# Patient Record
Sex: FEMALE | Race: BLACK OR AFRICAN AMERICAN | NOT HISPANIC OR LATINO
[De-identification: names, ages, dates, MRNs, and addresses within clinical notes are randomized per-mention and may not be internally consistent; named-entity substitution may affect disease eponyms.]

---
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
## 2024-06-07 ENCOUNTER — EMERGENCY (EMERGENCY)
Facility: HOSPITAL | Age: 19
LOS: 1 days | Discharge: HOME | End: 2024-06-07
Attending: EMERGENCY MEDICINE
Payer: COMMERCIAL

## 2024-06-07 VITALS
WEIGHT: 224.87 LBS | SYSTOLIC BLOOD PRESSURE: 119 MMHG | OXYGEN SATURATION: 98 % | TEMPERATURE: 98 F | RESPIRATION RATE: 20 BRPM | DIASTOLIC BLOOD PRESSURE: 73 MMHG | HEART RATE: 83 BPM

## 2024-06-07 PROCEDURE — 99283 EMERGENCY DEPT VISIT LOW MDM: CPT

## 2024-06-07 PROCEDURE — 99282 EMERGENCY DEPT VISIT SF MDM: CPT

## 2024-06-07 NOTE — ED PROVIDER NOTE - OBJECTIVE STATEMENT
19 y/o F with no reported PMHx/PSHx p/w c/o intermittent pain over right palm x 1 week. Pain only triggered with certain movements. Denies any trauma/injury. Thinks she might have strained her hand with heavy lifting at work ([employer]). Denies numbness over fingers, fever/chills, n/v, weakness, sob, cp, abdominal pain, back pain, dizziness, rash, and with no other c/o. Of note, pt is right hand dominant.

## 2024-06-07 NOTE — ED PROVIDER NOTE - CARE PROVIDER_API CALL
Heriberto Michael  Orthopaedic Surgery  [address] [place] [address]  Phone: [phone]  Fax: [phone]  Follow Up Time: 7-10 Days

## 2024-06-07 NOTE — ED ADULT NURSE NOTE - OBJECTIVE STATEMENT
18 Y O Female presented to the ED with c/o pain in right palm x 1 week. Patient stated she has been straining her hand at work, she is right dominant. Able move all fingers in right hand. Denies injury, tingling or numbness.

## 2024-06-07 NOTE — ED PROVIDER NOTE - NSFOLLOWUPINSTRUCTIONS_ED_ALL_ED_FT
Please take motrin 600mg every 6 hours as needed for pain.  1)Follow up with hand clinic in  1 week  Return to the emergency room if you are experiencing any new or worsening symptoms    Strain    A strain is a stretch or tear in one of the muscles in your body. This is caused by an injury to the area such as a twisting mechanism. Symptoms include pain, swelling, or bruising. Rest that area over the next several days and slowly resume activity when tolerated. Ice can help with swelling and pain.     SEEK IMMEDIATE MEDICAL CARE IF YOU HAVE ANY OF THE FOLLOWING SYMPTOMS: worsening pain, inability to move that body part, numbness or tingling.

## 2024-06-07 NOTE — ED PROVIDER NOTE - PATIENT PORTAL LINK FT
You can access the FollowMyHealth Patient Portal offered by [facility] by registering at the following website: [email or web address]. By joining [employer]’s FollowMyHealth portal, you will also be able to view your health information using other applications (apps) compatible with our system. You can access the FollowMyHealth Patient Portal offered by [facility] by registering at the following website: [email or web address]. By joining [employer]’s FollowMyHealth portal, you will also be able to view your health information using other applications (apps) compatible with our system.

## 2024-06-07 NOTE — ED ADULT NURSE NOTE - NSFALLUNIVINTERV_ED_ALL_ED
Bed/Stretcher in lowest position, wheels locked, appropriate side rails in place/Call bell, personal items and telephone in reach/Instruct patient to call for assistance before getting out of bed/chair/stretcher/Non-slip footwear applied when patient is off stretcher/[place] to call system/Physically safe environment - no spills, clutter or unnecessary equipment/Purposeful proactive rounding/Room/bathroom lighting operational, light cord in reach

## 2024-06-07 NOTE — ED PROVIDER NOTE - CLINICAL SUMMARY MEDICAL DECISION MAKING FREE TEXT BOX
17 y/o F with no reported PMHx/PSHx p/w c/o intermittent pain over right palm x 1 week. Most likely strain. ACE instructions given. Xray right hand. F/u hand clinic in 1 week. Rx motrin prn pain. Strict ED return precautions given if any new or worsening symptoms. 17 y/o F with no reported PMHx/PSHx p/w c/o intermittent pain over right palm x 1 week. Most likely strain. ACE instructions given.  F/u hand clinic in 1 week. Rx motrin prn pain. Strict ED return precautions given if any new or worsening symptoms.

## 2024-06-07 NOTE — ED PROVIDER NOTE - ATTENDING APP SHARED VISIT CONTRIBUTION OF CARE
Siddhartha: I performed a face to face bedside interview with patient regarding history of present illness, review of symptoms and past medical history. I completed an independent physical exam.  I have discussed patient's plan of care with advanced care provider.   I agree with note as stated above including HISTORY OF PRESENT ILLNESS, HIV, PAST MEDICAL/SURGICAL/FAMILY/SOCIAL HISTORY, ALLERGIES AND HOME MEDICATIONS, REVIEW OF SYSTEMS, PHYSICAL EXAM, MEDICAL DECISION MAKING and any PROGRESS NOTES during the time I functioned as the attending physician for this patient  unless otherwise noted. My brief assessment is as follows: no pmh p/w 1 week of intermittent pain to ulnar side of right palmar region. states from 3rd finger over in distal palm region. no skin changes, no numbness. no other complaints. no ana ttp. full rom. no sign fracture, ace wrapped, supportive care. hand f/u. return precautions.